# Patient Record
Sex: MALE | Race: BLACK OR AFRICAN AMERICAN
[De-identification: names, ages, dates, MRNs, and addresses within clinical notes are randomized per-mention and may not be internally consistent; named-entity substitution may affect disease eponyms.]

---

## 2018-09-17 ENCOUNTER — HOSPITAL ENCOUNTER (INPATIENT)
Dept: HOSPITAL 97 - ER | Age: 53
LOS: 2 days | Discharge: HOME | DRG: 571 | End: 2018-09-19
Attending: INTERNAL MEDICINE | Admitting: FAMILY MEDICINE
Payer: COMMERCIAL

## 2018-09-17 VITALS — BODY MASS INDEX: 58.7 KG/M2

## 2018-09-17 DIAGNOSIS — I96: ICD-10-CM

## 2018-09-17 DIAGNOSIS — L03.115: ICD-10-CM

## 2018-09-17 DIAGNOSIS — Z87.891: ICD-10-CM

## 2018-09-17 DIAGNOSIS — E66.9: ICD-10-CM

## 2018-09-17 DIAGNOSIS — L02.611: Primary | ICD-10-CM

## 2018-09-17 DIAGNOSIS — I10: ICD-10-CM

## 2018-09-17 LAB
ALBUMIN SERPL BCP-MCNC: 4 G/DL (ref 3.4–5)
ALP SERPL-CCNC: 54 U/L (ref 45–117)
ALT SERPL W P-5'-P-CCNC: 89 U/L (ref 12–78)
AMYLASE SERPL-CCNC: 73 U/L (ref 25–115)
AST SERPL W P-5'-P-CCNC: 50 U/L (ref 15–37)
BUN BLD-MCNC: 11 MG/DL (ref 7–18)
CKMB CREATINE KINASE MB: 1.4 NG/ML (ref 0.3–3.6)
CRP SERPL-MCNC: 11.2 MG/L (ref ?–3)
GLUCOSE SERPLBLD-MCNC: 98 MG/DL (ref 74–106)
HCT VFR BLD CALC: 46.2 % (ref 39.6–49)
INR BLD: 1.08
LIPASE SERPL-CCNC: 99 U/L (ref 73–393)
LYMPHOCYTES # SPEC AUTO: 2.3 K/UL (ref 0.7–4.9)
MCH RBC QN AUTO: 29 PG (ref 27–35)
MCV RBC: 88 FL (ref 80–100)
PMV BLD: 10.2 FL (ref 7.6–11.3)
POTASSIUM SERPL-SCNC: 4.3 MMOL/L (ref 3.5–5.1)
RBC # BLD: 5.25 M/UL (ref 4.33–5.43)
TROPONIN (EMERG DEPT USE ONLY): < 0.02 NG/ML (ref 0–0.04)

## 2018-09-17 PROCEDURE — 84484 ASSAY OF TROPONIN QUANT: CPT

## 2018-09-17 PROCEDURE — 82150 ASSAY OF AMYLASE: CPT

## 2018-09-17 PROCEDURE — 82550 ASSAY OF CK (CPK): CPT

## 2018-09-17 PROCEDURE — 96367 TX/PROPH/DG ADDL SEQ IV INF: CPT

## 2018-09-17 PROCEDURE — 80076 HEPATIC FUNCTION PANEL: CPT

## 2018-09-17 PROCEDURE — 87075 CULTR BACTERIA EXCEPT BLOOD: CPT

## 2018-09-17 PROCEDURE — 93005 ELECTROCARDIOGRAM TRACING: CPT

## 2018-09-17 PROCEDURE — 86140 C-REACTIVE PROTEIN: CPT

## 2018-09-17 PROCEDURE — 83735 ASSAY OF MAGNESIUM: CPT

## 2018-09-17 PROCEDURE — 87186 SC STD MICRODIL/AGAR DIL: CPT

## 2018-09-17 PROCEDURE — 85025 COMPLETE CBC W/AUTO DIFF WBC: CPT

## 2018-09-17 PROCEDURE — 83605 ASSAY OF LACTIC ACID: CPT

## 2018-09-17 PROCEDURE — 36415 COLL VENOUS BLD VENIPUNCTURE: CPT

## 2018-09-17 PROCEDURE — 84145 PROCALCITONIN (PCT): CPT

## 2018-09-17 PROCEDURE — 82553 CREATINE MB FRACTION: CPT

## 2018-09-17 PROCEDURE — 87070 CULTURE OTHR SPECIMN AEROBIC: CPT

## 2018-09-17 PROCEDURE — 85610 PROTHROMBIN TIME: CPT

## 2018-09-17 PROCEDURE — 99285 EMERGENCY DEPT VISIT HI MDM: CPT

## 2018-09-17 PROCEDURE — 80048 BASIC METABOLIC PNL TOTAL CA: CPT

## 2018-09-17 PROCEDURE — 87205 SMEAR GRAM STAIN: CPT

## 2018-09-17 PROCEDURE — 71045 X-RAY EXAM CHEST 1 VIEW: CPT

## 2018-09-17 PROCEDURE — 85652 RBC SED RATE AUTOMATED: CPT

## 2018-09-17 PROCEDURE — 88304 TISSUE EXAM BY PATHOLOGIST: CPT

## 2018-09-17 PROCEDURE — 87040 BLOOD CULTURE FOR BACTERIA: CPT

## 2018-09-17 PROCEDURE — 87077 CULTURE AEROBIC IDENTIFY: CPT

## 2018-09-17 PROCEDURE — 96365 THER/PROPH/DIAG IV INF INIT: CPT

## 2018-09-17 PROCEDURE — 83690 ASSAY OF LIPASE: CPT

## 2018-09-17 PROCEDURE — 80202 ASSAY OF VANCOMYCIN: CPT

## 2018-09-17 PROCEDURE — 85730 THROMBOPLASTIN TIME PARTIAL: CPT

## 2018-09-17 RX ADMIN — SODIUM CHLORIDE SCH MLS: 0.9 INJECTION, SOLUTION INTRAVENOUS at 22:45

## 2018-09-17 RX ADMIN — Medication SCH ML: at 21:00

## 2018-09-17 NOTE — ER
Nurse's Notes                                                                                     

 [facility]                                                                

Name: Elie Del Rio                                                                                  

Age: 53 yrs                                                                                       

Sex: Male                                                                                         

: 1965                                                                                   

MRN: [id number]                                                                                   

Arrival Date: 2018                                                                          

Time: 16:17                                                                                       

Account#: [id number]                                                                            

Bed 8                                                                                             

Private MD: Mikie Perry                                                                         

Diagnosis: Cellulitis and acute lymphangitis of other sites-right foot, dorsal;Obesity, unspecified

                                                                                                  

Presentation:                                                                                     

                                                                                             

16:35 Presenting complaint: Patient states: was sent by Dr. Perry for failed outpatient      iw  

      therapy, wound to top of right foot since 9/10-18, swelling and cellulitis to right         

      leg, was put on Bactrim with no improvement, denies fever. Transition of care: patient      

      was received from another setting of care (ambulatory primary care physician practice),     

      Dr. Perry. Onset of symptoms was September 10, 2018. Risk Assessment: Do you want to       

      hurt yourself or someone else? Patient reports no desire to harm self or others.            

      Initial Sepsis Screen: Does the patient meet any 2 criteria? No. Patient's initial          

      sepsis screen is negative. Does the patient have a suspected source of infection? No.       

      Patient's initial sepsis screen is negative. Care prior to arrival: None.                   

16:35 Method Of Arrival: Wheelchair                                                           iw  

16:35 Acuity: EH 3                                                                           iw  

                                                                                                  

Historical:                                                                                       

- Allergies:                                                                                      

16:37 NKA;                                                                                    iw  

- Home Meds:                                                                                      

16:37 unknown BP med [Active];                                                                iw  

- PMHx:                                                                                           

16:37 Hypertension;                                                                           iw  

- PSHx:                                                                                           

16:37 right hand;                                                                             iw  

                                                                                                  

- Immunization history:: Adult Immunizations not up to date.                                      

- Social history:: Smoking status: Patient uses tobacco products, chewing tobacco.                

- Ebola Screening: : Patient negative for fever greater than or equal to 101.5 degrees            

  Fahrenheit, and additional compatible Ebola Virus Disease symptoms Patient denies               

  exposure to infectious person Patient denies travel to an Ebola-affected area in the            

  21 days before illness onset No symptoms or risks identified at this time.                      

                                                                                                  

                                                                                                  

Screenin:41 Abuse screen: Denies threats or abuse. Denies injuries from another. Nutritional        sv  

      screening: No deficits noted. Tuberculosis screening: No symptoms or risk factors           

      identified. Fall Risk None identified.                                                      

                                                                                                  

Assessment:                                                                                       

16:40 General: Appears in no apparent distress. uncomfortable, obese, well developed,         sv  

      Behavior is calm, cooperative, appropriate for age. Pain: Complains of pain in right        

      foot Pain currently is 10 out of 10 on a pain scale. Is continuous. Neuro: Level of         

      Consciousness is awake, alert, obeys commands, Oriented to person, place, time,             

      situation, Moves all extremities. Respiratory: Airway is patent Respiratory effort is       

      even, unlabored, Respiratory pattern is regular, symmetrical. Derm: Skin is normal,         

      Wound noted dorsum of right foot Wound is open and weeping. Musculoskeletal: Range of       

      motion: intact in all extremities, Swelling present in dorsum of right foot, right          

      second toe, right third toe and right fourth toe.                                           

17:00 Reassessment: Patient appears in no apparent distress at this time. No changes from     sv  

      previously documented assessment. Patient and/or family updated on plan of care and         

      expected duration. Pain level reassessed. Patient is alert, oriented x 3, equal             

      unlabored respirations, skin warm/dry/pink.                                                 

18:00 Reassessment: Patient appears in no apparent distress at this time. No changes from     sv  

      previously documented assessment. Patient and/or family updated on plan of care and         

      expected duration. Pain level reassessed. Patient is alert, oriented x 3, equal             

      unlabored respirations, skin warm/dry/pink.                                                 

18:47 Reassessment: Patient appears in no apparent distress at this time. No changes from     sv  

      previously documented assessment. Patient and/or family updated on plan of care and         

      expected duration. Pain level reassessed. Patient is alert, oriented x 3, equal             

      unlabored respirations, skin warm/dry/pink.                                                 

19:05 Reassessment: Patient appears in no apparent distress at this time. No changes from     sv  

      previously documented assessment. Patient and/or family updated on plan of care and         

      expected duration. Pain level reassessed. Patient is alert, oriented x 3, equal             

      unlabored respirations, skin warm/dry/pink.                                                 

20:20 Reassessment: Patient appears in no apparent distress at this time. Patient is alert,   aa1 

      oriented x 3, equal unlabored respirations, skin warm/dry/pink. Report given to Marivel       

      on 2nd floor.                                                                               

                                                                                                  

Vital Signs:                                                                                      

16:37  / 91; Pulse 79; Resp 16; Temp 97.6(O); Pulse Ox 98% on R/A; Weight 172.37 kg;    iw  

      Height 5 ft. 8 in. (172.72 cm); Pain 10/10;                                                 

17:00 Pulse 79; Resp 20; Pulse Ox 100% ;                                                      sv  

18:25  / 82; Pulse 70; Resp 20; Pulse Ox 98% ;                                          sv  

20:07  / 86; Pulse 70; Resp 18; Pulse Ox 97% on R/A; Pain 0/10;                         aa1 

16:37 Body Mass Index 57.78 (172.37 kg, 172.72 cm)                                            iw  

                                                                                                  

ED Course:                                                                                        

16:17 Patient arrived in ED.                                                                  sb2 

16:17 Mikie Perry MD is Private Physician.                                                 sb2 

16:30 Raya Pisano, RN is Primary Nurse.                                                  sv  

16:36 Triage completed.                                                                       iw  

16:37 Arm band placed on.                                                                     iw  

16:41 Patient has correct armband on for positive identification. Bed in low position. Adult  sv  

      w/ patient. Pulse ox on. NIBP on. Door closed. Head of bed elevated.                        

16:44 Bill Vazquez MD is Attending Physician.                                             amrita 

17:10 Inserted saline lock: 20 gauge in left antecubital area, using aseptic technique.       sv  

      ,using aseptic technique. done by [facility] Blood collected.                             

17:15 Initial lab(s) drawn, by me, sent to lab. First set of blood cultures drawn by ED staff.sv  

17:19 EKG done, by EKG tech. reviewed by Bill Vazquez MD.                                   sm3 

17:30 Second set of blood cultures drawn by ED staff.                                         sv  

17:43 X-ray completed. Portable x-ray completed in exam room.                                 jr1 

17:44 Chest Single View XRAY In Process Unspecified.                                          EDMS

17:59 X-ray completed. Portable x-ray completed in exam room.                                 jr1 

17:59 Radha Burgos MD is Hospitalizing Provider.                                            amrita 

18:00 Wound care: to cellulitis located on dorsum of right foot was cleaned with with NS,     sv  

      dressed with Bactroban ointment, non-adherent dressing and ace wrap..                       

18:03 Foot Right 3 View XRAY In Process Unspecified.                                          EDMS

19:03 Report given to Duyen CONNELLY and Disha RN.                                                 sv  

19:25 Primary Nurse role handed off by Raya Pisano, RN                                   sv  

19:41 Disha Quinn, RN is Primary Nurse.                                                     ak1 

19:47 No provider procedures requiring assistance completed. Patient admitted, IV remains in  ak1 

      place.                                                                                      

                                                                                                  

Administered Medications:                                                                         

17:05 CANCELLED (not needed): NS 0.9% (30 ml/kg) 30 ml/kg IV at bolus once; Sepsis Protocol   sv  

18:10 Drug: Bactroban Ointment 2 % 1 application Route: Topical; Site: affected area;         sv  

18:10 Drug: Zosyn 3.375 grams Route: IVPB; Infused Over: 60 mins; Site: left antecubital;     sv  

18:46 Follow up: Response: No adverse reaction; IV Status: Completed infusion; IV Intake:     sv  

      100ml                                                                                       

18:47 Drug: vancoMYCIN 1 grams Route: IVPB; Infused Over: 2 hrs; Site: left antecubital;      sv  

20:29 Follow up: IV Status: Completed infusion                                                aa1 

                                                                                                  

                                                                                                  

Intake:                                                                                           

18:46 IV: 100ml; Total: 100ml.                                                                sv  

                                                                                                  

Outcome:                                                                                          

18:09 Decision to Hospitalize by Provider.                                                    [facility] 

19:47 Condition: stable                                                                       ak 

19:47 Instructed on the need for admit.                                                           

20:30 Admitted to Med/surg accompanied by tech, family with patient, via wheelchair, room     aa1 

      212, with chart, Report called to  Marivel                                                    

20:31 Patient left the ED.                                                                    aa1 

                                                                                                  

Signatures:                                                                                       

Dispatcher [employer]                           EDMS                                                 

Raya Pisano RN RN sv Kern, Alissa RN                        RN   aa1                                                  

iBll Vazquez MD MD cha Ringgold, Jennifer jr1                                                  

Yamilka Mariano RN RN iw Krenek, Amber, RN                       RN   ak1                                                  

Andra Ferrer                                                  

Shazia Smith                              3                                                  

                                                                                                  

**************************************************************************************************

## 2018-09-17 NOTE — EKG
Test Date:    2018-09-17               Test Time:    17:13:15

Technician:   SAMANTA                                     

                                                     

MEASUREMENT RESULTS:                                       

Intervals:                                           

Rate:         71                                     

ID:           [id number]                                    

QRSD:         90                                     

QT:           414                                    

QTc:          449                                    

Axis:                                                

P:            60                                     

ID:           [id number]                                    

QRS:          62                                     

T:            26                                     

                                                     

INTERPRETIVE STATEMENTS:                                       

                                                     

Normal sinus rhythm with sinus arrhythmia

Normal ECG

No previous ECG available for comparison



Electronically Signed On 09-17-18 19:22:30 CDT by Jessee Spence

## 2018-09-17 NOTE — RAD REPORT
EXAM DESCRIPTION:  RAD - Foot Right 3 View - 9/17/2018 6:03 pm

 

CLINICAL HISTORY:  PAIN

 

COMPARISON:  No comparisons

 

FINDINGS:  Mild to moderate soft tissue swelling is present along the dorsum of the foot. No subcutan
eous gas seen. No fracture, subluxation or evidence of osteomyelitis. Small calcaneal spurs present.

## 2018-09-17 NOTE — P.HP
Certification for Inpatient


Patient admitted to: Inpatient


With expected LOS: >2 Midnights


Practitioner: I am a practitioner with admitting privileges, knowledge of 

patient current condition, hospital course, and medical plan of care.


Services: Services provided to patient in accordance with Admission 

requirements found in Title 42 Section 412.3 of the Code of Federal Regulations





Patient History


Date of Service: 09/17/18


Reason for admission: Cellulitis


History of Present Illness: 





Mr Del Rio is a 53-year-old male with history of hypertension, obesity, who start 

about 10 days ago with a bump in his dorsal aspect of his right foot.  Over the 

time he was progressing and getting swollen and painful.  He went to see his 

PCP who diagnosed him with cellulitis.  He was taking oral antibiotics, however 

despite this treatment, his wound did not improve, an infarct that was.  For 

that reason he was sent to ER for evaluation and treatment.  The patient denied 

any fever, chills or sweating episodes.  He does not remember having any trauma 

in this foot.  Laboratory work remarkable for normal WBC count, C reactive 

protein elevated.  X-ray right foot shows soft tissue swelling, but no sign of 

osteomyelitis.


Allergies





No Known Allergies Allergy (Verified 09/17/18 18:53)


 





Home medications list reviewed: Yes





- Past Medical/Surgical History


-: Hypertension


-: Obesity


-: Right hand





- Family History


Family History: Reviewed- Non-Contributory





- Social History


Smoking Status: Former smoker


Alcohol use: No


CD- Drugs: No


Place of Residence: Home





Review of Systems


10-point ROS is otherwise unremarkable





Physical Examination





- Physical Exam


General: Alert, In no apparent distress


HEENT: Atraumatic, PERRLA, Mucous membr. moist/pink, EOMI, Sclerae nonicteric


Neck: Supple, 2+ carotid pulse no bruit, No LAD, Without JVD or thyroid 

abnormality


Respiratory: Clear to auscultation bilaterally, Normal air movement


Cardiovascular: Regular rate/rhythm, Normal S1 S2


Gastrointestinal: Normal bowel sounds, No tenderness


Musculoskeletal: No tenderness


Integumentary: Skin lesion (Ulcerative lesion on the dorsal aspect of his right 

foot)


Neurological: Normal speech, Normal strength at 5/5 x4 extr, Normal tone, 

Normal affect


Lymphatics: No axilla or inguinal lymphadenopathy





- Studies


Laboratory Data (last 24 hrs)





09/17/18 17:15: PT 12.7 H, INR 1.08, APTT 32.1


09/17/18 17:15: WBC 5.1, Hgb 15.2, Hct 46.2, Plt Count 311


09/17/18 17:15: Sodium 137, Potassium 4.3, BUN 11, Creatinine 1.20, Glucose 98, 

Total Bilirubin 0.4, AST 50 H, ALT 89 H, Alkaline Phosphatase 54, Amylase 73, 

Lipase 99


09/17/18 17:04: PT Cancelled, INR Cancelled, APTT Cancelled


09/17/18 17:04: Sodium Cancelled, Potassium Cancelled, BUN Cancelled, 

Creatinine Cancelled, Glucose Cancelled, Total Bilirubin Cancelled, AST 

Cancelled, ALT Cancelled, Alkaline Phosphatase Cancelled, Amylase Cancelled, 

Lipase Cancelled








Assessment and Plan





- Plan





Assessment:


1. Cellulitis 


2. Obesity











Plan:


Will admit the patient in medical floor, will start empiric treatment with IV 

Zosyn and vancomycin.  Wound culture and blood cultures are in process.  

Consult surgery team for evaluation and recommendation.





- Advance Directives


Does patient have a Living Will: No


Does patient have a Durable POA for Healthcare: No





- Code Status/Comfort Care


Code Status Assessed: Yes


Code Status: Full Code

## 2018-09-18 LAB
BUN BLD-MCNC: 9 MG/DL (ref 7–18)
GLUCOSE SERPLBLD-MCNC: 92 MG/DL (ref 74–106)
HCT VFR BLD CALC: 41.3 % (ref 39.6–49)
LYMPHOCYTES # SPEC AUTO: 3.2 K/UL (ref 0.7–4.9)
MAGNESIUM SERPL-MCNC: 2.2 MG/DL (ref 1.8–2.4)
MCH RBC QN AUTO: 29.7 PG (ref 27–35)
MCV RBC: 88.9 FL (ref 80–100)
PMV BLD: 10.3 FL (ref 7.6–11.3)
POTASSIUM SERPL-SCNC: 4.3 MMOL/L (ref 3.5–5.1)
RBC # BLD: 4.65 M/UL (ref 4.33–5.43)

## 2018-09-18 PROCEDURE — 0JBQ0ZZ EXCISION OF RIGHT FOOT SUBCUTANEOUS TISSUE AND FASCIA, OPEN APPROACH: ICD-10-PCS

## 2018-09-18 RX ADMIN — PROMETHAZINE HYDROCHLORIDE ONE MG: 25 INJECTION INTRAMUSCULAR; INTRAVENOUS at 12:46

## 2018-09-18 RX ADMIN — MEPERIDINE HYDROCHLORIDE ONE MG: 50 INJECTION, SOLUTION INTRAMUSCULAR; INTRAVENOUS; SUBCUTANEOUS at 12:52

## 2018-09-18 RX ADMIN — ACETAMINOPHEN PRN MG: 500 TABLET, FILM COATED ORAL at 13:56

## 2018-09-18 RX ADMIN — FENTANYL CITRATE ONE MCG: 50 INJECTION, SOLUTION INTRAMUSCULAR; INTRAVENOUS at 12:33

## 2018-09-18 RX ADMIN — MEPERIDINE HYDROCHLORIDE ONE MG: 50 INJECTION, SOLUTION INTRAMUSCULAR; INTRAVENOUS; SUBCUTANEOUS at 12:43

## 2018-09-18 RX ADMIN — PROMETHAZINE HYDROCHLORIDE ONE MG: 25 INJECTION INTRAMUSCULAR; INTRAVENOUS at 12:30

## 2018-09-18 RX ADMIN — MEPERIDINE HYDROCHLORIDE ONE MG: 50 INJECTION, SOLUTION INTRAMUSCULAR; INTRAVENOUS; SUBCUTANEOUS at 12:57

## 2018-09-18 RX ADMIN — SODIUM CHLORIDE SCH MLS: 9 INJECTION, SOLUTION INTRAVENOUS at 22:57

## 2018-09-18 RX ADMIN — MEPERIDINE HYDROCHLORIDE ONE MG: 50 INJECTION, SOLUTION INTRAMUSCULAR; INTRAVENOUS; SUBCUTANEOUS at 13:05

## 2018-09-18 RX ADMIN — SODIUM CHLORIDE SCH: 0.9 INJECTION, SOLUTION INTRAVENOUS at 06:20

## 2018-09-18 RX ADMIN — Medication SCH: at 21:00

## 2018-09-18 RX ADMIN — ACETAMINOPHEN PRN MG: 500 TABLET, FILM COATED ORAL at 22:56

## 2018-09-18 RX ADMIN — Medication SCH ML: at 09:20

## 2018-09-18 RX ADMIN — PIPERACILLIN SODIUM AND TAZOBACTAM SODIUM SCH MLS: 3; .375 INJECTION, POWDER, LYOPHILIZED, FOR SOLUTION INTRAVENOUS at 09:20

## 2018-09-18 RX ADMIN — ENOXAPARIN SODIUM SCH MG: 40 INJECTION SUBCUTANEOUS at 09:20

## 2018-09-18 RX ADMIN — PIPERACILLIN SODIUM AND TAZOBACTAM SODIUM SCH MLS: 3; .375 INJECTION, POWDER, LYOPHILIZED, FOR SOLUTION INTRAVENOUS at 00:34

## 2018-09-18 RX ADMIN — PIPERACILLIN SODIUM AND TAZOBACTAM SODIUM SCH MLS: 3; .375 INJECTION, POWDER, LYOPHILIZED, FOR SOLUTION INTRAVENOUS at 16:26

## 2018-09-18 RX ADMIN — FENTANYL CITRATE ONE MCG: 50 INJECTION, SOLUTION INTRAMUSCULAR; INTRAVENOUS at 12:28

## 2018-09-18 RX ADMIN — SODIUM CHLORIDE SCH MLS: 0.9 INJECTION, SOLUTION INTRAVENOUS at 16:26

## 2018-09-18 RX ADMIN — SODIUM CHLORIDE SCH MLS: 9 INJECTION, SOLUTION INTRAVENOUS at 10:25

## 2018-09-18 NOTE — OP
Date of Procedure:  09/18/2018



Surgeon:  Ellis Del Rio MD



Preoperative Diagnoses:  Right foot cellulitis, abscess, necrotic wound of right leg.



Postoperative Diagnoses:  Right foot cellulitis, abscess, necrotic wound of right leg.



Procedure:  Excisional debridement of deep subcutaneous with abscess drainage of the right foot, abou
t 10 x 12 x 1 cm.



Estimated Blood Loss:  Less than 20 cc.



Specimen:  Necrotic tissue.



Findings:  The patient has a large necrotic wound that goes all the way down to tendon and muscles.  
All the fat on top of that area have to be removed.  The area was profusely irrigated.



Anesthesia:  General plus local.



Indications:  This is the case of a 53-year-old patient, who comes to us with right foot cellulitis a
nd abscess with necrotic wound.  Etiology of that is unknown.  The patient received antibiotics, did 
not improve, so sent to the hospital, and we were consulted for debridement.  Benefits, alternatives,
 and risks of debridement were fully explained to the patient which include but are not limited to in
fection, bleeding, damage to adjacent structures, anesthesia complication, recurrence, MI, and even d
eath.  He also understands this may not relieve his symptoms and he might need more than one surgical
 intervention.  He understands also we are going to incise and drain his abscess and he has to finish
 his antibiotics.



Description Of Procedure:  The patient was brought to the operating room, placed in supine position. 
 Anesthesia was done without complication.  A time-out was called.  Right foot was prepped and draped
 in sterile fashion.  Local anesthetic was applied, followed by sharp incision of the skin.  We notic
ed the necrotic tissue present, about 10 x 12 cm.  Whenever all that skin was removed, we noted the f
at underneath, was also necrotic, did have to be removed, exposing the muscle and tendon.  The area w
as profusely irrigated until clean.  Cultures were obtained.  The area was packed with wet-to-dry marry
ssing.  The patient tolerated the procedure well.  The patient was sent to recovery in stable conditi
on.





HM/MODL

DD:  09/18/2018 12:37:22Voice ID:  [id number]

DT:  09/18/2018 23:46:46Report ID:  [id number]

## 2018-09-18 NOTE — P.BOP
Preoperative diagnosis: right foot cellulitis, abscess, necrotic wound right leg


Primary procedure: Excisional debridement deep subcutaneous with abscess 

drainage R foot


Secondary procedure: [id number]


Estimated blood loss: <20cc


Specimen: necrotic tissue


Findings: see dictation


Anesthesia: General


Transferred to: Recovery Room


Condition: Good

## 2018-09-18 NOTE — P.PN
Subjective


Date of Service: 09/18/18


Chief Complaint: Cellulitis





Patient seen and examined at bedside with RN.  Chart reviewed.  Currently 

patient is awaiting or procedure at this time.  No complaints to offer 

overnight as well.





Review of Systems


10-point ROS is otherwise unremarkable





Physical Examination





- Vital Signs


Temperature: 97.9 F


Blood Pressure: 147/82


Pulse: 71


Respirations: 20


Pulse Ox (%): 94





- Physical Exam


General: Alert, In no apparent distress


HEENT: Atraumatic, PERRLA, EOMI


Neck: Supple, JVD not distended


Respiratory: Clear to auscultation bilaterally, Normal air movement


Cardiovascular: Regular rate/rhythm, Normal S1 S2


Gastrointestinal: Normal bowel sounds, No tenderness


Musculoskeletal: No tenderness


Integumentary: Tenderness/swelling, Erythema, Warmth, Other (Right foot 

cellulitis noted)


Neurological: Normal speech, Normal tone, Normal affect


Lymphatics: No axilla or inguinal lymphadenopathy





- Studies


Laboratory Data (last 24 hrs)





09/17/18 17:15: PT 12.7 H, INR 1.08, APTT 32.1


09/17/18 17:15: WBC 5.1, Hgb 15.2, Hct 46.2, Plt Count 311


09/17/18 17:15: Sodium 137, Potassium 4.3, BUN 11, Creatinine 1.20, Glucose 98, 

Total Bilirubin 0.4, AST 50 H, ALT 89 H, Alkaline Phosphatase 54, Amylase 73, 

Lipase 99


09/17/18 17:04: PT Cancelled, INR Cancelled, APTT Cancelled


09/17/18 17:04: WBC Cancelled, Hgb Cancelled, Hct Cancelled, Plt Count Cancelled


09/17/18 17:04: Sodium Cancelled, Potassium Cancelled, BUN Cancelled, 

Creatinine Cancelled, Glucose Cancelled, Total Bilirubin Cancelled, AST 

Cancelled, ALT Cancelled, Alkaline Phosphatase Cancelled, Amylase Cancelled, 

Lipase Cancelled





Microbiology Data (last 24 hrs): 








09/17/18 17:15   Wound - Right Foot   Gram Stain - Final


09/17/18 17:15   Blood  - Blood   Anaerobic Blood Culture - Final


09/17/18 17:15   Blood  - Blood   Anaerobic Blood Culture - Final





Medications List Reviewed: Yes





Assessment And Plan





- Plan





Assessment and plan


1. Right foot cellulitis


-General surgery consulted at this time 


-Patient is scheduled for OR today.  Will followup postprocedure


-IV antibiotics 


-blood and wound cultures pending





2. Morbid obesity


-educated on diet and exercise





3.  Hypertension


-restart home medication at this time








Disposition


Awaiting clinical improvement at this time will follow up with patient post 

procedure.  Will switch antibiotics to oral antibiotics once wound culture is 

back.


Discharge Plan: Home


Plan to discharge in: 48 Hours





- Code Status/Comfort Care


Code Status Assessed: Yes


Critical Care: No

## 2018-09-18 NOTE — CON
Date of Consultation:  09/17/2018



Diagnosis:  Cellulitis and abscess of the right foot.



History Of Present Illness:  This is a case of a 53-year-old patient, history of hypertension, obesit
y comes to us after failing outpatient treatment of p.o. antibiotics for right foot cellulitis and co
mes to us with fluctuance and abscess.  The patient was admitted to the hospital.  A surgical consult
 was obtained.  He does not remember any trauma.  He does not remember any origin of this.  He denies
 any dysuria, hematuria, hematochezia, or melena.



Past Medical History:  As above.



Family History:  Noncontributory.



Social History:  He used to smoke but not anymore.  He does not drink alcohol.



Allergies:  NONE.



Review of Systems:

Ten points otherwise unremarkable.



Physical Examination:

General:  The patient is awake and alert. 

HEENT: Pupils are equal and reactive, anicteric. 

Neck:  Supple. 

Chest:  Clear. 

Heart:  S1, S2. 

Abdomen:  Soft and depressible.  Nontender.  Nondistended.  Bowel sounds positive. 

Extremities:  Right lower extremity shows an abscess over the dorsum of the foot and distal foot into
 the toes third and fourth area.  There is fluctuance present.  Dorsalis pedis pulses bilaterally are
 still present.  No cyanosis.



Laboratory Data:  Blood work shows WBC count of 5.1 with hemoglobin of 15.2.  INR of 1.08.  Glucose 9
8.  Right foot x-ray interpreted by Dr. Llamas as soft tissue swelling over the area.  No gas.  No frac
ture or osteomyelitis.



Assessment:  Right foot abscess.  The patient needs incision and drainage with benefits, alternatives
, and risks, which include but are not limited to infection, bleeding, damage to adjacent structures,
 anesthesia complication, nonhealing wound, myocardial infarction, and even death.  He also understan
ds this may not relieve any symptoms, he might need more than one surgical intervention.  He also und
erstands he will require wound care.  He will sign a consent.





YUSUF/COLEEN

DD:  09/17/2018 23:00:49Voice ID:  [id number]

DT:  09/18/2018 03:57:06Report ID:  [id number]

## 2018-09-19 VITALS — OXYGEN SATURATION: 96 %

## 2018-09-19 VITALS — SYSTOLIC BLOOD PRESSURE: 139 MMHG | DIASTOLIC BLOOD PRESSURE: 85 MMHG | TEMPERATURE: 98.2 F

## 2018-09-19 RX ADMIN — PIPERACILLIN SODIUM AND TAZOBACTAM SODIUM SCH MLS: 3; .375 INJECTION, POWDER, LYOPHILIZED, FOR SOLUTION INTRAVENOUS at 01:56

## 2018-09-19 RX ADMIN — SODIUM CHLORIDE SCH MLS: 0.9 INJECTION, SOLUTION INTRAVENOUS at 01:57

## 2018-09-19 RX ADMIN — PIPERACILLIN SODIUM AND TAZOBACTAM SODIUM SCH MLS: 3; .375 INJECTION, POWDER, LYOPHILIZED, FOR SOLUTION INTRAVENOUS at 16:51

## 2018-09-19 RX ADMIN — SODIUM CHLORIDE SCH MLS: 9 INJECTION, SOLUTION INTRAVENOUS at 11:00

## 2018-09-19 RX ADMIN — PIPERACILLIN SODIUM AND TAZOBACTAM SODIUM SCH MLS: 3; .375 INJECTION, POWDER, LYOPHILIZED, FOR SOLUTION INTRAVENOUS at 09:00

## 2018-09-19 RX ADMIN — SODIUM CHLORIDE SCH: 0.9 INJECTION, SOLUTION INTRAVENOUS at 12:20

## 2018-09-19 RX ADMIN — ENOXAPARIN SODIUM SCH MG: 40 INJECTION SUBCUTANEOUS at 09:00

## 2018-09-19 RX ADMIN — Medication SCH: at 09:00

## 2018-09-19 NOTE — P.SSS
Patient History


Date of Service: 09/19/18


Reason for admission: Cellulitis


History of Present Illness: 





Mr Del Rio is a 53-year-old male with history of hypertension, obesity, who start 

about 10 days ago with a bump in his dorsal aspect of his right foot.  Over the 

time he was progressing and getting swollen and painful.  He went to see his 

PCP who diagnosed him with cellulitis.  He was taking oral antibiotics, however 

despite this treatment, his wound did not improve, an infarct that was.  For 

that reason he was sent to ER for evaluation and treatment.  The patient denied 

any fever, chills or sweating episodes.  He does not remember having any trauma 

in this foot.  Laboratory work remarkable for normal WBC count, C reactive 

protein elevated.  X-ray right foot shows soft tissue swelling, but no sign of 

osteomyelitis.


Allergies





No Known Allergies Allergy (Verified 09/18/18 00:55)


 





Home Medications: 








Amlodipine Besylate 10 mg PO DAILY 09/18/18 








- Past Medical/Surgical History


Has patient received pneumonia vaccine in the past: No


Diabetic: No


-: Hypertension


-: Obesity


-: Right hand





- Family History


Family History: Reviewed- Non-Contributory





- Family History


  ** Mother


-: Hypertension





- Social History


Smoking Status: Former smoker


Alcohol use: Yes


CD- Drugs: No


Caffeine use: Yes


Place of Residence: Home





Review of Systems


10-point ROS is otherwise unremarkable





Physical Examination





- Vital Signs


Temperature: 97.9 F


Blood Pressure: 151/91


Pulse: 74


Respirations: 16


Pulse Ox (%): 99





- Physical Exam


General: Alert, In no apparent distress


HEENT: Atraumatic, PERRLA, Mucous membr. moist/pink, EOMI, Sclerae nonicteric


Neck: Supple, 2+ carotid pulse no bruit, No LAD, Without JVD or thyroid 

abnormality


Respiratory: Clear to auscultation bilaterally, Normal air movement


Cardiovascular: Regular rate/rhythm, Normal S1 S2


Gastrointestinal: Normal bowel sounds, No tenderness


Musculoskeletal: Erythema, Tenderness, Warmth


Integumentary: No rashes


Neurological: Normal gait, Normal speech, Normal strength at 5/5 x4 extr, 

Normal tone, Normal affect


Lymphatics: No axilla or inguinal lymphadenopathy





- Studies


Microbiology Data (last 24 hrs): 








09/17/18 17:15   Blood  - Blood   Anaerobic Blood Culture - Final


09/17/18 17:15   Blood  - Blood   Anaerobic Blood Culture - Final


09/17/18 17:15   Wound - Right Foot   Gram Stain - Final





Treatment Summary: 





Discharge diagnosis





1. Right foot cellulitis with abscess formation


2. Morbid obesity


3.  Hypertension





Hospital Course





Overall during the hospital course patient remained stable





Patient was initially admitted to the hospital for right foot cellulitis with 

abscess on the dorsal aspect of the foot.  General surgery was consulted who 

took the patient for incision and drainage.  Patient tolerated the procedure 

well and wound cultures were collected.  Initially patient was started on IV 

antibiotics however was switched over to oral antibiotics after the incision 

and drainage.  Patient did well overall within 24-48 hr while here in the 

hospital.  Patient then was discharged home under stable condition was asked to 

continue at the wound healing center for dressing changes.  Patient 

demonstrated understanding and had no further complication and thus was 

discharged home under stable condition.





- Disposition


Condition: GOOD


Patient Discharge Instructions: Please f.u with Dr Del Rio in 1 to 2 week post 

discharge and Wound healing center as directed by dr Del Rio


Diet: Regular


Activity: Ad kelsey

## 2018-09-19 NOTE — OP
Date of Procedure:  09/18/2018



Surgeon:  Ellis Del Rio MD



Preoperative Diagnosis:  Right foot cellulitis and abscess and necrotic wound of right leg.



Postoperative Diagnosis:  Right foot cellulitis and abscess and necrotic wound of right leg.



Procedure:  Excisional debridement of deep subcutaneous. 





DICTATION ENDS HERE.





MIKE

DD:  09/18/2018 14:15:21Voice ID:  [id number]

DT:  09/19/2018 01:05:37Report ID:  [id number]

## 2020-06-19 NOTE — RAD REPORT
EXAM DESCRIPTION:  RAD - Chest Single View - 9/17/2018 5:47 pm

 

CLINICAL HISTORY:  infection

Chest pain.

 

COMPARISON:  No comparisons

 

FINDINGS:  Portable technique limits examination quality.

 

The lungs are grossly clear. The heart is normal in size. No displaced fractures.

 

IMPRESSION:  No acute intrathoracic process suspected. Mely Abdalla  : 1935  [place]  Secondary:  [address] @ [address], [place].  [phone]   [phone]      OUTPATIENT PHYSICAL THERAPY: Daily Treatment and progress Note 2020  Visit Count:  5    ICD-10: Treatment Diagnosis: Pain in left shoulder (M25.512) and Stiffness of left shoulder, not elsewhere classified (M25.612) and Cervicalgia (M54.2) and Stiffness of unspecified joint, not elsewhere classified (M25.60)  TREATMENT PLAN:  Effective Dates: 2020 TO 2020 (30 days). Frequency/Duration: 2 times a week for 30 Day(s)  GOALS: (Goals have been discussed and agreed upon with patient.)  Short-Term Functional Goals: Time Frame: 2 weeks  # Goal Status   1 Pt will confirm compliance with home program to facilitate improvement in function. MET     Discharge goals: Time frame: 4 weeks   # Goal Status   1 Pt will be able to elevate UE to at least 120° without significant increase in symptoms in order to participate in ADLs such as reaching overhead and pulling shirt on.  20: L UE elevation to 123 °  MET   2 Pt will be able to reach over head to touch CTJ without significant increase in symptoms to be able to participate in ADLs such as reaching overhead and washing head. MET   3 Pt will be able to reach across body to touch spine of opposite scapula without significant increase in symptoms to be able to participate in ADLs such as reaching tasks. MET   4 Pt will be able to reach behind back to touch TLJ without significant increase in symptoms to be able to participate in dressing and toileting activities. MET   5 Pt will be able to reach behind themselves with arm elevated without symptoms to be able to participate in ADLs such as reaching tasks and grabbing seat belt. MET   6 Pt will be able to perform all cervical AROM WNL and pain-free to be able to perform ADLs.  ADDED 20        Pre-treatment Symptoms/Complaints:  He had neck fusion in 2004 or 2005 which he reported he recovered well from. Pt reports he saw MD and got MRI which shows moderate degenerative changes in neck. He has a script for PT for neck and reports his neck has pain with L SB and rotation and will sometimes crack with sharp pain. Pain: Initial:   No pain Post Session:  No pain   Medications Last Reviewed: 6/19/2020  Updated Objective Findings: Below measures from initial evaluation unless otherwise noted. 6/8/20  Pain at worst: 8/10  ROM:    Left Right   Elevation (°) 114 with pain 123   Behind neck reach To CTJ with pain To CTJ   Behind back reach To TLJ with pain that radiated down towards elbow To TLJ   Cross body reach To opposite scapula with mild pain To opposite scapula       UE ANDT: All within normal limits B  Strength: Pt able to actively lift L UE and perform S/L ER with mild increase in pain. He could perform S/L abduction without increase in pain. Palpation: TTP throughout entire L neck and shoulder. DASH: 41/55    6/19/20:   L UE elevation to 123 ° with no pain. Pt able to reach in all directions on L [facility] and symmetrical to R.   R cervical rotation to 72 ° , L cervical rotation to 52 ° with L sided pain. Pain with L SB on L side of neck   TREATMENT:   THERAPEUTIC ACTIVITY: (see chart for minutes): Therapeutic activities per grid below to improve mobility, strength, balance and coordination. Required minimal visual, verbal and tactile cues to improve independence with functional mobility and activities. THERAPEUTIC EXERCISE: (see chart for minutes):  Exercises per grid below to improve mobility, strength, balance and coordination. Required minimal visual, verbal and tactile cues to promote proper body alignment and promote proper body mechanics. Progressed resistance, range, repetitions and complexity of movement as indicated.   NEUROMUSCULAR RE-EDUCATION: (see chart for minutes):  Exercise/activities per grid below to improve balance, coordination, kinesthetic sense, posture, pain, and proprioception. Required minimal visual, verbal and tactile cues to promote motor control of right, upper extremity(s). MANUAL THERAPY: (see chart for minutes): Joint mobilization, Soft tissue mobilization, skin mobilization, and muscle energy techniques were utilized and necessary because of the patient's restricted joint motion, restricted motion of soft tissue and pain . MODALITIES: (see chart for minutes): *  Hot Pack Therapy in order to provide analgesia and relieve muscle spasm. Date: 6/8/20 (visit 1) 6/10/20 (visit 2)  6/12/20 (visit 3)  6/15/20 (visit 4)  6/19/20 (visit 5)    Modalities:                Therapeutic Exercise: 15 min 40 min 30 min 30 min 30 min    S/L ER: x5 R  S/L abduction: x5 R  Prone elbow lift: x5 B, x5 B with head lift. Pt educated to perform with emphasis on relaxation after lift. Prone LE lift: x5 B   Pt educated on home program implementation and given printout. Prone elbow lift: x5 B, 2x5 B with head lift with focus on relaxing UT. Scapular AROM: 3x3 in all 4 directions. Band ER: 3x10 red. Pt cued to focus on relaxing UT and rotating L shoulder  Band pull-apart: 3x10 red with cues for relaxing UT  Band press: x10 B  Prone elbow lift: x10 B, x10 B   Band ER: 2x10 red. Band pull-apart: 2x10 red with cues for relaxing UT  Band press: 2x10 B   Band ER in abduction: 2x10 B with second set improved on L Prone elbow lift: 2x10 B   Band ER: 2x10 red. Band pull-apart: 2x10 red   Band press: 2x10 B red  Band ER in abduction: 2x10 B red  Band D2 flexion: 2x10 B red  Pt given updated home program to continue working on. L SB contract-relax with self-palpation: x3  Band ER: 2x10 blue.   Band pull-apart: 2x10 blue  Band press: 2x10 B blue  Band ER in abduction: 2x10 B blue  Band D2 flexion: 2x10 B blue   Neuromuscular re-education   10 min 8 min       Progressive relaxation utilized to improve muscular tension and pain in L shoulder. Pt reported afterwards that it felt good and he had no pain afterwards. Progressive relaxation routine with verbal cues first time. Pt practiced x2 independently and reported it went well. He was reported to use breathing and word \"relax\" when practicing on his own. Manual Therapy: 10 min    10 min    STM to R neck, anterior and posterior shoulder. Pt hypersensitive to pressure but reported the manual therapy helped. SB MET in supine: x3 B    Therapeutic Activities:                  Home program: 6/8/20: prone elbow lift contract-relax  6/15/20: elbow lift, band ER, band pull-apart, band press, band ER in abduction, band D2 flexion  6/19/20: add SB contract-relax in seated     [employer] Portal  Treatment/Session Summary:    · Response to Treatment: Pt demonstrates significantly improved shoulder AROM and strength. He reports it feels much better. He is limited with cervical AROM, especially L SB and rotation. He will benefit from skilled physical therapy to address these limitations. · Communication/Consultation:  None today  · Equipment provided today:  6/10/20: red band. 6/15/20: pt given another red band   · Recommendations/Intent for next treatment session: Next visit will focus on improving pain-free cervical AROM.      Total Treatment Billable Duration:  40 minutes  PT Patient Time In/Time Out  Time In: 1373  Time Out: 7615  Narcisa Baptiste PT, DPT    Future Appointments   Date Time Provider Jacob Camacho   6/22/2020  4:15 PM [facility]   6/29/2020  8:45 AM Jose Eduardo Mayorga MD [facility]   6/29/2020  4:15 PM [facility]   7/1/2020  2:45 PM [facility]   7/6/2020  9:30 AM [facility]   7/8/2020  9:30 AM [facility]   [facility]  [facility]   7/15/2020  9:30 AM [facility]   7/20/2020  9:30 AM [facility]   7/22/2020  9:30 [facility]   7/27/2020  9:30 [facility]   7/29/2020  9:30 [facility]   11/16/2020  9:30 AM Casper Wooten MD [facility]

## 2022-08-27 NOTE — EDPHYS
Physician Documentation                                                                           

 [facility]                                                                

Name: Elie Del Rio                                                                                  

Age: 53 yrs                                                                                       

Sex: Male                                                                                         

: 1965                                                                                   

MRN: [id number]                                                                                   

Arrival Date: 2018                                                                          

Time: 16:17                                                                                       

Account#: [id number]                                                                            

Bed 8                                                                                             

Private MD: Mikie Perry ED Physician Bill Vazquez                                                                      

HPI:                                                                                              

                                                                                             

17:45 This 53 yrs old Black Male presents to ER via Wheelchair with complaints of Wound       amrita 

      Infection.                                                                                  

                                                                                                  

Historical:                                                                                       

- Allergies:                                                                                      

16:37 NKA;                                                                                    iw  

- Home Meds:                                                                                      

16:37 unknown BP med [Active];                                                                iw  

- PMHx:                                                                                           

16:37 Hypertension;                                                                           iw  

- PSHx:                                                                                           

16:37 right hand;                                                                             iw  

                                                                                                  

- Immunization history:: Adult Immunizations not up to date.                                      

- Social history:: Smoking status: Patient uses tobacco products, chewing tobacco.                

- Ebola Screening: : Patient negative for fever greater than or equal to 101.5 degrees            

  Fahrenheit, and additional compatible Ebola Virus Disease symptoms Patient denies               

  exposure to infectious person Patient denies travel to an Ebola-affected area in the            

  21 days before illness onset No symptoms or risks identified at this time.                      

                                                                                                  

                                                                                                  

ROS:                                                                                              

17:46 Constitutional: Negative for fever, chills, and weight loss, Eyes: Negative for injury, amrita 

      pain, redness, and discharge, ENT: Negative for injury, pain, and discharge, Neck:          

      Negative for injury, pain, and swelling, Cardiovascular: Negative for chest pain,           

      palpitations, and edema, Respiratory: Negative for shortness of breath, cough,              

      wheezing, and pleuritic chest pain, Abdomen/GI: Negative for abdominal pain, nausea,        

      vomiting, diarrhea, and constipation, Back: Negative for injury and pain, : Negative      

      for injury, bleeding, discharge, and swelling, Neuro: Negative for headache, weakness,      

      numbness, tingling, and seizure, Psych: Negative for depression, anxiety, suicide           

      ideation, homicidal ideation, and hallucinations, Allergy/Immunology: Negative for          

      hives, rash, and allergies, Endocrine: Negative for neck swelling, polydipsia,              

      polyuria, polyphagia, and marked weight changes.                                            

17:46 MS/extremity: Positive for decreased range of motion, pain, swelling, tenderness,           

      warmth, of the dorsum of right foot.                                                        

                                                                                                  

Exam:                                                                                             

17:46 Constitutional:  This is a well developed, well nourished patient who is awake, alert,  amrita 

      and in no acute distress. Head/Face:  Normocephalic, atraumatic. Eyes:  Pupils equal        

      round and reactive to light, extra-ocular motions intact.  Lids and lashes normal.          

      Conjunctiva and sclera are non-icteric and not injected.  Cornea within normal limits.      

      Periorbital areas with no swelling, redness, or edema. ENT:  Nares patent. No nasal         

      discharge, no septal abnormalities noted.  Tympanic membranes are normal and external       

      auditory canals are clear.  Oropharynx with no redness, swelling, or masses, exudates,      

      or evidence of obstruction, uvula midline.  Mucous membranes moist. Neck:  Trachea          

      midline, no thyromegaly or masses palpated, and no cervical lymphadenopathy.  Supple,       

      full range of motion without nuchal rigidity, or vertebral point tenderness.  No            

      Meningismus. Chest/axilla:  Normal chest wall appearance and motion.  Nontender with no     

      deformity.  No lesions are appreciated. Cardiovascular:  Regular rate and rhythm with a     

      normal S1 and S2.  No gallops, murmurs, or rubs.  Normal PMI, no JVD.  No pulse             

      deficits. Respiratory:  Lungs have equal breath sounds bilaterally, clear to                

      auscultation and percussion.  No rales, rhonchi or wheezes noted.  No increased work of     

      breathing, no retractions or nasal flaring. Back:  No spinal tenderness.  No                

      costovertebral tenderness.  Full range of motion. Male :  Normal genitalia with no        

      discharge or lesions. Skin:  Warm, dry with normal turgor.  Normal color with no            

      rashes, no lesions, and no evidence of cellulitis. MS/ Extremity:  Pulses equal, no         

      cyanosis.  Neurovascular intact.  Full, normal range of motion. Neuro:  Awake and           

      alert, GCS 15, oriented to person, place, time, and situation.  Cranial nerves II-XII       

      grossly intact.  Motor strength 5/5 in all extremities.  Sensory grossly intact.            

      Cerebellar exam normal.  Normal gait. Psych:  Awake, alert, with orientation to person,     

      place and time.  Behavior, mood, and affect are within normal limits.                       

17:46 Abdomen/GI:                                                                                 

17:46 Musculoskeletal/extremity: ROM: no acute changes, intact in all extremities, full           

      active range of motion, full passive range of motion.                                       

17:46 Skin: abscess, that is small, cellulitis, that is minimal, that is mild, induration,        

      that is moderate is noted, injury, abrasion(s), avulsion(s), a small                        

18:56 Musculoskeletal/extremity: DVT Exam: No signs of deep vein thrombosis. no pain, no      amrita 

      swelling, no tenderness, negative Homans' sign noted on exam, no appreciated bluish         

      discoloration, no erythema, no increased warmth.                                            

                                                                                                  

Vital Signs:                                                                                      

16:37  / 91; Pulse 79; Resp 16; Temp 97.6(O); Pulse Ox 98% on R/A; Weight 172.37 kg;    iw  

      Height 5 ft. 8 in. (172.72 cm); Pain 10/10;                                                 

17:00 Pulse 79; Resp 20; Pulse Ox 100% ;                                                      sv  

18:25  / 82; Pulse 70; Resp 20; Pulse Ox 98% ;                                          sv  

20:07  / 86; Pulse 70; Resp 18; Pulse Ox 97% on R/A; Pain 0/10;                         aa1 

16:37 Body Mass Index 57.78 (172.37 kg, 172.72 cm)                                            iw  

                                                                                                  

MDM:                                                                                              

16:44 Patient medically screened.                                                             [facility] 

17:56 Data reviewed: vital signs, nurses notes, lab test result(s), EKG, radiologic studies,  [facility] 

      plain films.                                                                                

                                                                                                  

                                                                                             

17:04 Order name: Blood Culture Adult (2)                                                       

                                                                                             

17:04 Order name: CBC with Diff                                                               sv  

                                                                                             

17:04 Order name: Sed Rate                                                                    sv  

                                                                                             

17:24 Order name: Lactate; Complete Time: 18:09                                               [facility]

                                                                                             

17:31 Order name: Basic Metabolic Panel; Complete Time: 18:32                                 [facility]

                                                                                             

17:31 Order name: Creatine Phosphokinase; Complete Time: 18:32                                [facility]

                                                                                             

17:31 Order name: CKMB Creatine Kinase MB; Complete Time: 18:32                               [facility]

                                                                                             

17:31 Order name: C-Reactive Protein; Complete Time: 18:32                                    [facility]

                                                                                             

17:31 Order name: Amylase Level; Complete Time: 18:32                                         [facility]

                                                                                             

17:31 Order name: CBC with Automated Diff; Complete Time: 20:10                               [facility]

                                                                                             

17:31 Order name: Blood Culture                                                               [facility]

                                                                                             

17:31 Order name: Blood Culture                                                               [facility]

                                                                                             

17:31 Order name: Wound Culture                                                               [facility]

                                                                                             

17:36 Order name: Liver (Hepatic) Function; Complete Time: 18:32                              [facility]

                                                                                             

17:36 Order name: Troponin (Emerg Dept Use Only); Complete Time: 18:32                        [facility]

                                                                                             

17:36 Order name: Lipase; Complete Time: 18:32                                                [facility]

                                                                                             

17:36 Order name: Procalcitonin                                                               [facility]

                                                                                             

17:04 Order name: Chest Single View XRAY; Complete Time: 18:09                                  

                                                                                             

17:04 Order name: EKG - Nurse/Tech; Complete Time: 17:19                                      sv  

                                                                                             

17:04 Order name: IV Saline Lock - Large Bore; Complete Time: 17:19                           sv  

                                                                                             

17:04 Order name: Labs collected and sent; Complete Time: 17:19                               sv  

                                                                                             

17:04 Order name: O2 Per Protocol; Complete Time: 17:19                                       sv  

                                                                                             

17:04 Order name: O2 Sat Monitoring; Complete Time: 17:19                                       

                                                                                             

17:36 Order name: Sedimentation Rate, Westergren; Complete Time: 20:10                        [facility]

                                                                                             

17:36 Order name: Protime (+INR); Complete Time: 18:56                                        [facility]

                                                                                             

17:36 Order name: PTT, Activated Partial Thromb; Complete Time: 18:56                         [facility]

                                                                                             

17:45 Order name: Foot Right 3 View XRAY; Complete Time: 18:56                                [facility] 

                                                                                             

18:04 Order name: CONS Physician Consult                                                      [facility]

                                                                                             

18:07 Order name: NPO                                                                         [facility]

                                                                                             

18:11 Order name: EKG Electrocardiogram                                                       [facility]

                                                                                                  

Administered Medications:                                                                         

17:05 CANCELLED (not needed): NS 0.9% (30 ml/kg) 30 ml/kg IV at bolus once; Sepsis Protocol   sv  

18:10 Drug: Bactroban Ointment 2 % 1 application Route: Topical; Site: affected area;         sv  

18:10 Drug: Zosyn 3.375 grams Route: IVPB; Infused Over: 60 mins; Site: left antecubital;     sv  

18:46 Follow up: Response: No adverse reaction; IV Status: Completed infusion; IV Intake:     sv  

      100ml                                                                                       

18:47 Drug: vancoMYCIN 1 grams Route: IVPB; Infused Over: 2 hrs; Site: left antecubital;      sv  

20:29 Follow up: IV Status: Completed infusion                                                aa1 

                                                                                                  

                                                                                                  

Disposition:                                                                                      

18 18:09 Hospitalization ordered by Radha Burgos for Inpatient Admission. Preliminary      

  diagnosis are Cellulitis and acute lymphangitis of other sites - right foot,                    

  dorsal, Obesity, unspecified.                                                                   

- Bed requested for Telemetry/MedSurg (Inpatient).                                                

- Status is Inpatient Admission.                                                              aa1 

- Condition is Stable.                                                                            

- Problem is new.                                                                                 

- Symptoms have improved.                                                                         

UTI on Admission? No                                                                              

                                                                                                  

                                                                                                  

                                                                                                  

Signatures:                                                                                       

Dispatcher [employer]                           EDMS                                                 

Raya Pisano RN RN                                                      

Marla Yin RN RN                                                      

Duyen Green RN RN   aa1                                                  

Bill Vazquez MD MD cha Williams, Irene, RN RN   iw                                                   

                                                                                                  

Corrections: (The following items were deleted from the chart)                                    

17:04 17:04 Accucheck ordered. sv                                                             sv  

17:05 17:04 NS 0.9% (30 ml/kg) 30 ml/kg IV at bolus once; Sepsis Protocol ordered. sv         sv  

17:18 17:04 Cardiac monitoring ordered. sv                                                    sv  

17:31 17:24 Chest Single View ordered. EDMS                                                   EDMS

17:49 17:25 Procalcitonin+C.LAB.BRZ ordered. EDMS                                             EDMS

17:49 17:25 PROTIME (+INR)+COAG.LAB.BRZ ordered. EDMS                                         EDMS

17:49 17:25 PTT, ACTIVATED+COAG.LAB.BRZ ordered. EDMS                                         EDMS

17:50 17:25 AMYLASE, SERUM+C.LAB.BRZ ordered. EDMS                                            EDMS

17:50 17:25 BASIC METABOLIC PANEL+C.LAB.BRZ ordered. EDMS                                     EDMS

17:50 17:25 C-REACTIVE PROTEIN+C.LAB.BRZ ordered. EDMS                                        EDMS

17:50 17:25 CKMB+C.LAB.BRZ ordered. EDMS                                                      EDMS

17:50 17:25 CREATINE PHOSPHOKINASE+C.LAB.BRZ ordered. EDMS                                    EDMS

17:50 17:25 LACTATE+C.LAB.BRZ ordered. EDMS                                                   EDMS

17:50 17:25 HEPATIC FUNCTION+C.LAB.BRZ ordered. EDMS                                          EDMS

17:50 17:25 LIPASE+C.LAB.BRZ ordered. EDMS                                                    EDMS

17:50 17:25 TROPONIN (EMERG DEPT USE ONLY)+C.LAB.BRZ ordered. EDMS                            EDMS

17:50 17:25 Wound Culture+BA.LAB.BRZ ordered. EDMS                                            EDMS

19:09 18:09 Hospitalization Ordered by Radha Burgos MD for Inpatient Admission. Preliminary   dw  

      diagnosis is Cellulitis and acute lymphangitis of other sites - right foot, dorsal;         

      Obesity, unspecified. Bed requested for Telemetry/MedSurg (Inpatient). Status is            

      Inpatient Admission. Condition is Stable. Problem is new. Symptoms have improved. UTI       

      on Admission? No. amrita                                                                       

20:31 19:09 2018 18:09 Hospitalization Ordered by Radha Burgos MD for Inpatient         aa1 

      Admission. Preliminary diagnosis is Cellulitis and acute lymphangitis of other sites -      

      right foot, dorsal; Obesity, unspecified. Bed requested for Telemetry/MedSurg               

      (Inpatient). Status is Inpatient Admission. Condition is Stable. Problem is new.            

      Symptoms have improved. UTI on Admission? No. dw                                            

                                                                                                  

************************************************************************************************** None